# Patient Record
Sex: MALE | Race: BLACK OR AFRICAN AMERICAN | URBAN - METROPOLITAN AREA
[De-identification: names, ages, dates, MRNs, and addresses within clinical notes are randomized per-mention and may not be internally consistent; named-entity substitution may affect disease eponyms.]

---

## 2021-08-26 ENCOUNTER — OFFICE VISIT (OUTPATIENT)
Dept: URBAN - METROPOLITAN AREA CLINIC 105 | Facility: CLINIC | Age: 51
End: 2021-08-26
Payer: COMMERCIAL

## 2021-08-26 ENCOUNTER — WEB ENCOUNTER (OUTPATIENT)
Dept: URBAN - METROPOLITAN AREA CLINIC 105 | Facility: CLINIC | Age: 51
End: 2021-08-26

## 2021-08-26 DIAGNOSIS — K50.10 CROHN'S DISEASE OF LARGE INTESTINE WITHOUT COMPLICATION: ICD-10-CM

## 2021-08-26 PROCEDURE — 99204 OFFICE O/P NEW MOD 45 MIN: CPT | Performed by: INTERNAL MEDICINE

## 2021-08-26 RX ORDER — ADALIMUMAB 40MG/0.4ML
1 SUBQ  Q 2 WEEKS KIT SUBCUTANEOUS
Qty: 2 | Refills: 11 | OUTPATIENT
Start: 2021-08-28 | End: 2022-08-23

## 2021-08-26 RX ORDER — ATORVASTATIN CALCIUM 20 MG/1
1 TABLET TABLET, FILM COATED ORAL ONCE A DAY
Status: ACTIVE | COMMUNITY

## 2021-08-26 RX ORDER — LOSARTAN POTASSIUM AND HYDROCHLOROTHIAZIDE 12.5; 5 MG/1; MG/1
TABLET ORAL
Qty: 90 | Status: ACTIVE | COMMUNITY

## 2021-08-26 RX ORDER — GLUCOSAMINE/CHONDR SU A SOD 750-600 MG
1 CAPSULE TABLET ORAL ONCE A DAY
Status: ACTIVE | COMMUNITY

## 2021-08-26 RX ORDER — ADALIMUMAB 40MG/0.8ML
KIT SUBCUTANEOUS
Qty: 6 | Status: ACTIVE | COMMUNITY

## 2021-08-26 RX ORDER — OMEGA-3 FATTY ACIDS/FISH OIL 300-1000MG
1 CAPSULE CAPSULE ORAL ONCE A DAY
Status: ACTIVE | COMMUNITY

## 2021-08-26 NOTE — HPI-TODAY'S VISIT:
The patient presents for Crohn's disease.  Today, the patient says he moved to [place] from [place] 1 year ago. He previously followed-up with Dr. Shmuel Lizarraga at [facility]. He says he was initially diagnosed with UC then Crohn's 15 years ago - definite inflammation in colon. He has been on Humira every 2 weeks starting in 2012, but was off the injections for 3 weeks during which he had a colon (Sept 2020, his last one) which noted inflammation. He was off Humira from Sept 2020-April 2021 because of a lapse in insurance - coverage resumed in March. His PCP is Conchita Palacios MD ([facility]). He has previously taken Lialda and prior to that sulfasalazine. He has never taken 6-MP. He denies diarrhea, rectal bleeding, or abdominal pain. Last labs were more than a year ago. He is in the film business.  PMH: Htn, hyperlipidemia, vit D deficiency. PSH: Finger surgery - right hand. SH: social drinker 2x/week, no recreational or tobacco use. FH: Father - HD, cancer.

## 2021-08-28 LAB
A/G RATIO: 1.2
ALBUMIN: 4.1
ALKALINE PHOSPHATASE: 89
ALT (SGPT): 23
AST (SGOT): 28
BASO (ABSOLUTE): 0
BASOS: 1
BILIRUBIN, TOTAL: 0.4
BUN/CREATININE RATIO: 10
BUN: 11
CALCIUM: 9.3
CARBON DIOXIDE, TOTAL: 22
CHLORIDE: 103
CREATININE: 1.1
EGFR IF AFRICN AM: 89
EGFR IF NONAFRICN AM: 77
EOS (ABSOLUTE): 0.1
EOS: 2
GLOBULIN, TOTAL: 3.3
GLUCOSE: 80
HBSAG SCREEN: NEGATIVE
HEMATOCRIT: 43
HEMATOLOGY COMMENTS:: (no result)
HEMOGLOBIN: 13.1
HEP B CORE AB, TOT: NEGATIVE
HEPATITIS B SURF AB QUANT: <3.1
IMMATURE CELLS: (no result)
IMMATURE GRANS (ABS): 0
IMMATURE GRANULOCYTES: 0
LYMPHS (ABSOLUTE): 1.7
LYMPHS: 37
MCH: 25.3
MCHC: 30.5
MCV: 83
MONOCYTES(ABSOLUTE): 0.7
MONOCYTES: 15
NEUTROPHILS (ABSOLUTE): 2.2
NEUTROPHILS: 45
NRBC: (no result)
PLATELETS: 263
POTASSIUM: 4
PROTEIN, TOTAL: 7.4
QUANTIFERON CRITERIA: (no result)
QUANTIFERON INCUBATION: (no result)
QUANTIFERON MITOGEN VALUE: >10
QUANTIFERON NIL VALUE: 0.05
QUANTIFERON TB1 AG VALUE: 0.06
QUANTIFERON TB2 AG VALUE: 0.09
QUANTIFERON-TB GOLD PLUS: NEGATIVE
RBC: 5.17
RDW: 13.9
SODIUM: 138
T4,FREE(DIRECT): 1.26
TSH: 1.08
WBC: 4.7

## 2021-12-14 ENCOUNTER — OFFICE VISIT (OUTPATIENT)
Dept: URBAN - METROPOLITAN AREA CLINIC 105 | Facility: CLINIC | Age: 51
End: 2021-12-14

## 2022-03-30 ENCOUNTER — WEB ENCOUNTER (OUTPATIENT)
Dept: URBAN - METROPOLITAN AREA CLINIC 105 | Facility: CLINIC | Age: 52
End: 2022-03-30

## 2022-03-30 ENCOUNTER — LAB OUTSIDE AN ENCOUNTER (OUTPATIENT)
Dept: URBAN - METROPOLITAN AREA CLINIC 105 | Facility: CLINIC | Age: 52
End: 2022-03-30

## 2022-03-30 ENCOUNTER — OFFICE VISIT (OUTPATIENT)
Dept: URBAN - METROPOLITAN AREA CLINIC 105 | Facility: CLINIC | Age: 52
End: 2022-03-30
Payer: COMMERCIAL

## 2022-03-30 VITALS
BODY MASS INDEX: 26.64 KG/M2 | HEART RATE: 76 BPM | HEIGHT: 73 IN | TEMPERATURE: 96.8 F | DIASTOLIC BLOOD PRESSURE: 98 MMHG | WEIGHT: 201 LBS | SYSTOLIC BLOOD PRESSURE: 148 MMHG

## 2022-03-30 DIAGNOSIS — K50.10 CROHN'S DISEASE OF LARGE INTESTINE WITHOUT COMPLICATION: ICD-10-CM

## 2022-03-30 PROCEDURE — 99214 OFFICE O/P EST MOD 30 MIN: CPT | Performed by: INTERNAL MEDICINE

## 2022-03-30 RX ORDER — GLUCOSAMINE/CHONDR SU A SOD 750-600 MG
1 CAPSULE TABLET ORAL ONCE A DAY
Status: ACTIVE | COMMUNITY

## 2022-03-30 RX ORDER — ATORVASTATIN CALCIUM 20 MG/1
1 TABLET TABLET, FILM COATED ORAL ONCE A DAY
Status: ACTIVE | COMMUNITY

## 2022-03-30 RX ORDER — OMEGA-3 FATTY ACIDS/FISH OIL 300-1000MG
1 CAPSULE CAPSULE ORAL ONCE A DAY
Status: ACTIVE | COMMUNITY

## 2022-03-30 RX ORDER — ADALIMUMAB 40MG/0.4ML
1 SUBQ  Q 2 WEEKS KIT SUBCUTANEOUS
Qty: 2 | Refills: 11 | Status: ACTIVE | COMMUNITY
Start: 2021-08-28 | End: 2022-08-23

## 2022-03-30 RX ORDER — ADALIMUMAB 40MG/0.8ML
KIT SUBCUTANEOUS
Qty: 6 | Status: ACTIVE | COMMUNITY

## 2022-03-30 RX ORDER — LOSARTAN POTASSIUM AND HYDROCHLOROTHIAZIDE 12.5; 5 MG/1; MG/1
TABLET ORAL
Qty: 90 | Status: ACTIVE | COMMUNITY

## 2022-03-30 NOTE — HPI-TODAY'S VISIT:
The patient presents on follow-up for Crohn's disease.  PAST MEDICAL HISTORY: On 8/26/21, the patient said he moved to [place] from [place] 1 year ago. He previously followed-up with Dr. Shmuel Lizarraga at [facility]. He said he was initially diagnosed with UC then Crohn's 15 years ago - definite inflammation in colon. He had been on Humira every 2 weeks starting in 2012, but was off the injections for 3 weeks during which he had a colon (Sept 2020, his last one) which noted inflammation. He was off Humira from Sept 2020-April 2021 because of a lapse in insurance - coverage resumed in March. His PCP is Conchita Palacios MD ([facility]). He had previously taken Lialda and prior to that sulfasalazine. He had never taken 6-MP. He denied diarrhea, rectal bleeding, or abdominal pain. Last labs were more than a year ago. He was in the film business.  PMH: Htn, hyperlipidemia, vit D deficiency. PSH: Finger surgery - right hand. SH: social drinker 2x/week, no recreational or tobacco use. FH: Father - HD, cancer.  HPI: Today, he says he continues on Humira Q 2 weeks - remembers to take his dose (typically on Sundays). He has incorporated more fish and veggies into his diet. He reports looser stools lately. He has a BM 3-4x/week occasionally with no formation (soft). He does not see blood.   Labs 8/26/21 - HbsAg <3.1. Quant-TB negative. CBC, CMP, TSH/FT4 all normal.

## 2022-04-12 LAB
A/G RATIO: 1.5
ADALIMUMAB DRUG LEVEL: 3.6
ALBUMIN: 4.5
ALKALINE PHOSPHATASE: 80
ALT (SGPT): 17
ANTI-ADALIMUMAB ANTIBODY: <25
AST (SGOT): 19
BASO (ABSOLUTE): 0
BASOS: 1
BILIRUBIN, TOTAL: 0.3
BUN/CREATININE RATIO: 12
BUN: 13
CALCIUM: 9.5
CARBON DIOXIDE, TOTAL: 21
CHLORIDE: 104
CREATININE: 1.11
EGFR: 80
EOS (ABSOLUTE): 0.1
EOS: 1
GLOBULIN, TOTAL: 3.1
GLUCOSE: 91
HEMATOCRIT: 43.1
HEMATOLOGY COMMENTS:: (no result)
HEMOGLOBIN: 13.5
IMMATURE CELLS: (no result)
IMMATURE GRANS (ABS): 0
IMMATURE GRANULOCYTES: 0
LYMPHS (ABSOLUTE): 2.6
LYMPHS: 57
MCH: 25.1
MCHC: 31.3
MCV: 80
MONOCYTES(ABSOLUTE): 0.7
MONOCYTES: 14
NEUTROPHILS (ABSOLUTE): 1.2
NEUTROPHILS: 27
NRBC: (no result)
PLATELETS: 236
POTASSIUM: 3.7
PROTEIN, TOTAL: 7.6
QUANTIFERON CRITERIA: (no result)
QUANTIFERON INCUBATION: (no result)
QUANTIFERON MITOGEN VALUE: >10
QUANTIFERON NIL VALUE: 0.22
QUANTIFERON TB1 AG VALUE: 0.18
QUANTIFERON TB2 AG VALUE: 0.28
QUANTIFERON-TB GOLD PLUS: NEGATIVE
RBC: 5.37
RDW: 14.8
SODIUM: 139
WBC: 4.5

## 2022-08-29 ENCOUNTER — ERX REFILL RESPONSE (OUTPATIENT)
Dept: URBAN - METROPOLITAN AREA CLINIC 105 | Facility: CLINIC | Age: 52
End: 2022-08-29

## 2022-08-29 RX ORDER — ADALIMUMAB 40MG/0.8ML
KIT SUBCUTANEOUS
Qty: 6 | OUTPATIENT

## 2022-08-29 RX ORDER — ADALIMUMAB 40MG/0.8ML
40 MG KIT SUBCUTANEOUS EVERY OTHER WEEK
Qty: 6 | Refills: 3 | OUTPATIENT

## 2022-09-01 ENCOUNTER — OFFICE VISIT (OUTPATIENT)
Dept: URBAN - METROPOLITAN AREA CLINIC 105 | Facility: CLINIC | Age: 52
End: 2022-09-01

## 2022-09-12 ENCOUNTER — OFFICE VISIT (OUTPATIENT)
Dept: URBAN - METROPOLITAN AREA SURGERY CENTER 16 | Facility: SURGERY CENTER | Age: 52
End: 2022-09-12

## 2022-09-20 ENCOUNTER — TELEPHONE ENCOUNTER (OUTPATIENT)
Dept: URBAN - METROPOLITAN AREA CLINIC 105 | Facility: CLINIC | Age: 52
End: 2022-09-20

## 2022-10-04 ENCOUNTER — LAB OUTSIDE AN ENCOUNTER (OUTPATIENT)
Dept: URBAN - METROPOLITAN AREA CLINIC 105 | Facility: CLINIC | Age: 52
End: 2022-10-04

## 2022-10-04 ENCOUNTER — OFFICE VISIT (OUTPATIENT)
Dept: URBAN - METROPOLITAN AREA CLINIC 105 | Facility: CLINIC | Age: 52
End: 2022-10-04
Payer: COMMERCIAL

## 2022-10-04 VITALS
TEMPERATURE: 97.3 F | SYSTOLIC BLOOD PRESSURE: 151 MMHG | DIASTOLIC BLOOD PRESSURE: 96 MMHG | HEART RATE: 71 BPM | BODY MASS INDEX: 25.34 KG/M2 | HEIGHT: 73 IN | WEIGHT: 191.2 LBS

## 2022-10-04 DIAGNOSIS — K50.10 CROHN'S DISEASE OF LARGE INTESTINE WITHOUT COMPLICATION: ICD-10-CM

## 2022-10-04 DIAGNOSIS — E55.9 VITAMIN D DEFICIENCY: ICD-10-CM

## 2022-10-04 PROCEDURE — 99214 OFFICE O/P EST MOD 30 MIN: CPT | Performed by: INTERNAL MEDICINE

## 2022-10-04 RX ORDER — LOSARTAN POTASSIUM AND HYDROCHLOROTHIAZIDE 12.5; 5 MG/1; MG/1
TABLET ORAL
Qty: 90 | Status: ACTIVE | COMMUNITY

## 2022-10-04 RX ORDER — OMEGA-3 FATTY ACIDS/FISH OIL 300-1000MG
1 CAPSULE CAPSULE ORAL ONCE A DAY
Status: ACTIVE | COMMUNITY

## 2022-10-04 RX ORDER — GLUCOSAMINE/CHONDR SU A SOD 750-600 MG
1 CAPSULE TABLET ORAL ONCE A DAY
Status: ACTIVE | COMMUNITY

## 2022-10-04 RX ORDER — ATORVASTATIN CALCIUM 20 MG/1
1 TABLET TABLET, FILM COATED ORAL ONCE A DAY
Status: ACTIVE | COMMUNITY

## 2022-10-04 RX ORDER — ADALIMUMAB 40MG/0.8ML
40 MG KIT SUBCUTANEOUS EVERY OTHER WEEK
Qty: 6 | Refills: 3 | Status: ACTIVE | COMMUNITY

## 2022-10-04 NOTE — HPI-TODAY'S VISIT:
The patient presents on follow-up for Crohn's disease.  PAST MEDICAL HISTORY: On 8/26/21, the patient said he moved to [place] from [place] 1 year ago. He previously followed-up with Dr. Shmuel Lizarraga at [facility]. He said he was initially diagnosed with UC then Crohn's 15 years ago - definite inflammation in colon. He had been on Humira every 2 weeks starting in 2012, but was off the injections for 3 weeks during which he had a colon (Sept 2020, his last one) which noted inflammation. He was off Humira from Sept 2020-April 2021 because of a lapse in insurance - coverage resumed in March. His PCP is Conchita Palacios MD ([facility]). He had previously taken Lialda and prior to that sulfasalazine. He had never taken 6-MP. He denied diarrhea, rectal bleeding, or abdominal pain. Last labs were more than a year ago. He was in the film business.  PMH: Htn, hyperlipidemia, vit D deficiency. PSH: Finger surgery - right hand. SH: social drinker 2x/week, no recreational or tobacco use. FH: Father - HD, cancer.  On 3/30/22, he said he continued on Humira Q 2 weeks - remembered to take his dose (typically on Sundays). He had incorporated more fish and veggies into his diet. He reported looser stools lately. He had a BM 3-4x/week occasionally with no formation (soft). He did not see blood.  HPI: Today, he says he is compliant with taking Humira every other week - no bleeding, abdominal pain, or diarrhea. He finished Cephalexin 2 weeks ago for a kidney infection.    Labs 4/1/22 - Adalimumab drug level 3.6/Ab <25. Quant-TB negative. CBC, CMP all normal. 8/26/21 - HbsAg <3.1. Quant-TB negative. CBC, CMP, TSH/FT4 all normal.

## 2022-10-05 LAB
A/G RATIO: 1.2
ABSOLUTE BASOPHILS: 22
ABSOLUTE EOSINOPHILS: 72
ABSOLUTE LYMPHOCYTES: 1955
ABSOLUTE MONOCYTES: 443
ABSOLUTE NEUTROPHILS: 1109
ALBUMIN: 4.3
ALKALINE PHOSPHATASE: 86
ALT (SGPT): 17
AST (SGOT): 18
BASOPHILS: 0.6
BILIRUBIN, TOTAL: 0.6
BUN/CREATININE RATIO: (no result)
BUN: 16
CALCIUM: 9.7
CARBON DIOXIDE, TOTAL: 24
CHLORIDE: 104
CREATININE: 0.95
EGFR: 96
EOSINOPHILS: 2
GLOBULIN, TOTAL: 3.5
GLUCOSE: 81
HEMATOCRIT: 40.2
HEMOGLOBIN: 13.4
LYMPHOCYTES: 54.3
MCH: 27.1
MCHC: 33.3
MCV: 81.4
MONOCYTES: 12.3
MPV: 11
NEUTROPHILS: 30.8
PLATELET COUNT: 175
POTASSIUM: 3.7
PROTEIN, TOTAL: 7.8
RDW: 14.7
RED BLOOD CELL COUNT: 4.94
SODIUM: 139
VITAMIN D,25-OH,TOTAL,IA: 77
WHITE BLOOD CELL COUNT: 3.6

## 2022-10-17 ENCOUNTER — OFFICE VISIT (OUTPATIENT)
Dept: URBAN - METROPOLITAN AREA SURGERY CENTER 16 | Facility: SURGERY CENTER | Age: 52
End: 2022-10-17

## 2023-05-08 ENCOUNTER — ERX REFILL RESPONSE (OUTPATIENT)
Dept: URBAN - METROPOLITAN AREA CLINIC 105 | Facility: CLINIC | Age: 53
End: 2023-05-08

## 2023-05-08 RX ORDER — ADALIMUMAB 40MG/0.4ML
INJECT 1 PEN UNDER THE SKIN EVERY 14 DAYS KIT SUBCUTANEOUS
Qty: 6 | Refills: 3 | OUTPATIENT

## 2023-05-08 RX ORDER — ADALIMUMAB 40MG/0.8ML
40 MG KIT SUBCUTANEOUS EVERY OTHER WEEK
Qty: 6 | Refills: 3 | OUTPATIENT

## 2023-08-23 ENCOUNTER — OFFICE VISIT (OUTPATIENT)
Dept: URBAN - METROPOLITAN AREA CLINIC 105 | Facility: CLINIC | Age: 53
End: 2023-08-23
Payer: COMMERCIAL

## 2023-08-23 ENCOUNTER — LAB OUTSIDE AN ENCOUNTER (OUTPATIENT)
Dept: URBAN - METROPOLITAN AREA CLINIC 105 | Facility: CLINIC | Age: 53
End: 2023-08-23

## 2023-08-23 VITALS
DIASTOLIC BLOOD PRESSURE: 83 MMHG | BODY MASS INDEX: 25.98 KG/M2 | WEIGHT: 196 LBS | HEART RATE: 93 BPM | SYSTOLIC BLOOD PRESSURE: 130 MMHG | TEMPERATURE: 98 F | HEIGHT: 73 IN

## 2023-08-23 DIAGNOSIS — E55.9 VITAMIN D DEFICIENCY: ICD-10-CM

## 2023-08-23 DIAGNOSIS — K50.10 CROHN'S DISEASE OF LARGE INTESTINE WITHOUT COMPLICATION: ICD-10-CM

## 2023-08-23 PROCEDURE — 99214 OFFICE O/P EST MOD 30 MIN: CPT | Performed by: INTERNAL MEDICINE

## 2023-08-23 RX ORDER — ATORVASTATIN CALCIUM 20 MG/1
1 TABLET TABLET, FILM COATED ORAL ONCE A DAY
Status: ACTIVE | COMMUNITY

## 2023-08-23 RX ORDER — GLUCOSAMINE/CHONDR SU A SOD 750-600 MG
1 CAPSULE TABLET ORAL ONCE A DAY
Status: ACTIVE | COMMUNITY

## 2023-08-23 RX ORDER — LOSARTAN POTASSIUM AND HYDROCHLOROTHIAZIDE 12.5; 5 MG/1; MG/1
TABLET ORAL
Qty: 90 | Status: ACTIVE | COMMUNITY

## 2023-08-23 RX ORDER — OMEGA-3 FATTY ACIDS/FISH OIL 300-1000MG
1 CAPSULE CAPSULE ORAL ONCE A DAY
Status: ACTIVE | COMMUNITY

## 2023-08-23 RX ORDER — ADALIMUMAB 40MG/0.4ML
INJECT 1 PEN UNDER THE SKIN EVERY 14 DAYS KIT SUBCUTANEOUS
Qty: 6 | Refills: 3 | Status: ACTIVE | COMMUNITY

## 2023-08-23 NOTE — HPI-TODAY'S VISIT:
The patient presents on follow-up for Crohn's disease.  PAST MEDICAL HISTORY: On 8/26/21, the patient said he moved to [place] from [place] 1 year ago. He previously followed-up with Dr. Shmuel Lizarraga at [facility]. He said he was initially diagnosed with UC then Crohn's 15 years ago - definite inflammation in colon. He had been on Humira every 2 weeks starting in 2012, but was off the injections for 3 weeks during which he had a colon (Sept 2020, his last one) which noted inflammation. He was off Humira from Sept 2020-April 2021 because of a lapse in insurance - coverage resumed in March. His PCP is Conchita Palacios MD ([facility]). He had previously taken Lialda and prior to that sulfasalazine. He had never taken 6-MP. He denied diarrhea, rectal bleeding, or abdominal pain. Last labs were more than a year ago. He was in the film business.  PMH: Htn, hyperlipidemia, vit D deficiency. PSH: Finger surgery - right hand. SH: social drinker 2x/week, no recreational or tobacco use. FH: Father - HD, cancer.  On 3/30/22, he said he continued on Humira Q 2 weeks - remembered to take his dose (typically on Sundays). He had incorporated more fish and veggies into his diet. He reported looser stools lately. He had a BM 3-4x/week occasionally with no formation (soft). He did not see blood.  On 10/4/22, he said he was compliant with taking Humira every other week - no bleeding, abdominal pain, or diarrhea. He finished Cephalexin 2 weeks ago for a kidney infection.    HPI: Today, he says he had a recent kidney infection. He continues on Humira Q2W - no diarrhea, pain, or bleeding. He is on vit D supplementation. He is currently not working due to the [employer] strike.  Labs 10/4/22 - CBC, CMP, vit D all normal. 4/1/22 - Adalimumab drug level 3.6/Ab <25. Quant-TB negative. CBC, CMP all normal. 8/26/21 - HbsAg <3.1. Quant-TB negative. CBC, CMP, TSH/FT4 all normal.

## 2023-08-27 PROBLEM — 34713006: Status: ACTIVE | Noted: 2022-10-04

## 2023-09-05 LAB
A/G RATIO: 1.4
ALBUMIN: 4.6
ALKALINE PHOSPHATASE: 80
ALT (SGPT): 12
AST (SGOT): 19
BASO (ABSOLUTE): 0
BASOS: 1
BILIRUBIN, TOTAL: 0.6
BUN/CREATININE RATIO: 10
BUN: 11
CALCIUM: 9.4
CARBON DIOXIDE, TOTAL: 20
CHLORIDE: 107
CREATININE: 1.05
EGFR: 84
EOS (ABSOLUTE): 0
EOS: 1
GLOBULIN, TOTAL: 3.2
GLUCOSE: 93
HEMATOCRIT: 44.1
HEMATOLOGY COMMENTS:: (no result)
HEMOGLOBIN: 14
IMMATURE CELLS: (no result)
IMMATURE GRANS (ABS): 0
IMMATURE GRANULOCYTES: 0
INTERPRETATION:: (no result)
LYMPHS (ABSOLUTE): 1.6
LYMPHS: 40
Lab: (no result)
MCH: 26.7
MCHC: 31.7
MCV: 84
MONOCYTES(ABSOLUTE): 0.6
MONOCYTES: 14
NEUTROPHILS (ABSOLUTE): 1.8
NEUTROPHILS: 44
NRBC: (no result)
PLATELETS: 231
POTASSIUM: 3.7
PROTEIN, TOTAL: 7.8
QUANTIFERON CRITERIA: (no result)
QUANTIFERON INCUBATION: (no result)
QUANTIFERON MITOGEN VALUE: >10
QUANTIFERON NIL VALUE: 0.05
QUANTIFERON TB1 AG VALUE: 0.06
QUANTIFERON TB2 AG VALUE: 0.07
QUANTIFERON-TB GOLD PLUS: NEGATIVE
RBC: 5.25
RDW: 14.5
SODIUM: 141
TPMT ACTIVITY: 27
WBC: 4.1

## 2023-09-14 ENCOUNTER — CLAIMS CREATED FROM THE CLAIM WINDOW (OUTPATIENT)
Dept: URBAN - METROPOLITAN AREA CLINIC 4 | Facility: CLINIC | Age: 53
End: 2023-09-14
Payer: COMMERCIAL

## 2023-09-14 ENCOUNTER — OUT OF OFFICE VISIT (OUTPATIENT)
Dept: URBAN - METROPOLITAN AREA SURGERY CENTER 16 | Facility: SURGERY CENTER | Age: 53
End: 2023-09-14
Payer: COMMERCIAL

## 2023-09-14 DIAGNOSIS — K63.89 APPENDICITIS EPIPLOICA: ICD-10-CM

## 2023-09-14 DIAGNOSIS — K62.89 ACUTE PROCTITIS: ICD-10-CM

## 2023-09-14 DIAGNOSIS — K50.10 CROHN'S DISEASE OF LARGE INTESTINE WITHOUT COMPLICATIONS: ICD-10-CM

## 2023-09-14 DIAGNOSIS — K52.89 (LYMPHOCYTIC) MICROSCOPIC COLITIS: ICD-10-CM

## 2023-09-14 DIAGNOSIS — K52.89 OTHER SPECIFIED NONINFECTIVE GASTROENTERITIS AND COLITIS: ICD-10-CM

## 2023-09-14 DIAGNOSIS — K50.10 CC (CROHN'S COLITIS): ICD-10-CM

## 2023-09-14 DIAGNOSIS — K64.8 EXTERNAL HEMORRHOIDS: ICD-10-CM

## 2023-09-14 PROCEDURE — 00811 ANES LWR INTST NDSC NOS: CPT | Performed by: ANESTHESIOLOGY

## 2023-09-14 PROCEDURE — 88305 TISSUE EXAM BY PATHOLOGIST: CPT | Performed by: PATHOLOGY

## 2023-09-14 PROCEDURE — 45380 COLONOSCOPY AND BIOPSY: CPT | Performed by: INTERNAL MEDICINE

## 2023-09-14 PROCEDURE — G8907 PT DOC NO EVENTS ON DISCHARG: HCPCS | Performed by: INTERNAL MEDICINE

## 2023-09-14 PROCEDURE — 00811 ANES LWR INTST NDSC NOS: CPT | Performed by: NURSE ANESTHETIST, CERTIFIED REGISTERED

## 2023-09-14 RX ORDER — GLUCOSAMINE/CHONDR SU A SOD 750-600 MG
1 CAPSULE TABLET ORAL ONCE A DAY
Status: ACTIVE | COMMUNITY

## 2023-09-14 RX ORDER — LOSARTAN POTASSIUM AND HYDROCHLOROTHIAZIDE 12.5; 5 MG/1; MG/1
TABLET ORAL
Qty: 90 | Status: ACTIVE | COMMUNITY

## 2023-09-14 RX ORDER — ATORVASTATIN CALCIUM 20 MG/1
1 TABLET TABLET, FILM COATED ORAL ONCE A DAY
Status: ACTIVE | COMMUNITY

## 2023-09-14 RX ORDER — OMEGA-3 FATTY ACIDS/FISH OIL 300-1000MG
1 CAPSULE CAPSULE ORAL ONCE A DAY
Status: ACTIVE | COMMUNITY

## 2023-09-14 RX ORDER — ADALIMUMAB 40MG/0.4ML
INJECT 1 PEN UNDER THE SKIN EVERY 14 DAYS KIT SUBCUTANEOUS
Qty: 6 | Refills: 3 | Status: ACTIVE | COMMUNITY

## 2023-10-02 PROBLEM — 7620006: Status: ACTIVE | Noted: 2023-10-02

## 2023-10-04 ENCOUNTER — OFFICE VISIT (OUTPATIENT)
Dept: URBAN - METROPOLITAN AREA CLINIC 105 | Facility: CLINIC | Age: 53
End: 2023-10-04
Payer: COMMERCIAL

## 2023-10-04 VITALS
HEART RATE: 73 BPM | DIASTOLIC BLOOD PRESSURE: 88 MMHG | TEMPERATURE: 98.3 F | SYSTOLIC BLOOD PRESSURE: 142 MMHG | HEIGHT: 73 IN | BODY MASS INDEX: 26.19 KG/M2 | WEIGHT: 197.6 LBS

## 2023-10-04 DIAGNOSIS — E55.9 VITAMIN D DEFICIENCY: ICD-10-CM

## 2023-10-04 DIAGNOSIS — K50.10 CROHN'S DISEASE OF LARGE INTESTINE WITHOUT COMPLICATION: ICD-10-CM

## 2023-10-04 PROCEDURE — 99214 OFFICE O/P EST MOD 30 MIN: CPT | Performed by: INTERNAL MEDICINE

## 2023-10-04 RX ORDER — LOSARTAN POTASSIUM AND HYDROCHLOROTHIAZIDE 12.5; 5 MG/1; MG/1
TABLET ORAL
Qty: 90 | Status: ACTIVE | COMMUNITY

## 2023-10-04 RX ORDER — OMEGA-3 FATTY ACIDS/FISH OIL 300-1000MG
1 CAPSULE CAPSULE ORAL ONCE A DAY
Status: ACTIVE | COMMUNITY

## 2023-10-04 RX ORDER — GLUCOSAMINE/CHONDR SU A SOD 750-600 MG
1 CAPSULE TABLET ORAL ONCE A DAY
Status: ACTIVE | COMMUNITY

## 2023-10-04 RX ORDER — ADALIMUMAB 40MG/0.4ML
INJECT 1 PEN UNDER THE SKIN EVERY 14 DAYS KIT SUBCUTANEOUS
Qty: 6 | Refills: 3 | Status: ACTIVE | COMMUNITY

## 2023-10-04 RX ORDER — ATORVASTATIN CALCIUM 20 MG/1
1 TABLET TABLET, FILM COATED ORAL ONCE A DAY
Status: ACTIVE | COMMUNITY

## 2023-10-04 NOTE — HPI-TODAY'S VISIT:
The patient presents on follow-up for Crohn's disease.  PAST MEDICAL HISTORY: On 8/26/21, the patient said he moved to [place] from [place] 1 year ago. He previously followed-up with Dr. Shmuel Lizarraga at [facility]. He said he was initially diagnosed with UC then Crohn's 15 years ago - definite inflammation in colon. He had been on Humira every 2 weeks starting in 2012, but was off the injections for 3 weeks during which he had a colon (Sept 2020, his last one) which noted inflammation. He was off Humira from Sept 2020-April 2021 because of a lapse in insurance - coverage resumed in March. His PCP is Conchita Palacios MD ([facility]). He had previously taken Lialda and prior to that sulfasalazine. He had never taken 6-MP. He denied diarrhea, rectal bleeding, or abdominal pain. Last labs were more than a year ago. He was in the film business.  PMH: Htn, hyperlipidemia, vit D deficiency. PSH: Finger surgery - right hand. SH: social drinker 2x/week, no recreational or tobacco use. FH: Father - HD, cancer.  On 3/30/22, he said he continued on Humira Q 2 weeks - remembered to take his dose (typically on Sundays). He had incorporated more fish and veggies into his diet. He reported looser stools lately. He had a BM 3-4x/week occasionally with no formation (soft). He did not see blood.  On 10/4/22, he said he was compliant with taking Humira every other week - no bleeding, abdominal pain, or diarrhea. He finished Cephalexin 2 weeks ago for a kidney infection.    On 8/23/23, he said he had a recent kidney infection. He continued on Humira Q2W - no diarrhea, pain, or bleeding. He was on vit D supplementation. He was currently not working due to the [employer] strike.  HPI: Today, he says he continues on Humira Q2W. His last injection was on Sept 24 - no diarrhea, abdominal pain, or bleeding.  Labs 8/23/23 - Quant-TB negative. CBC, CMP, TPMT all normal. 10/4/22 - CBC, CMP, vit D all normal. 4/1/22 - Adalimumab drug level 3.6/Ab <25. Quant-TB negative. CBC, CMP all normal. 8/26/21 - HbsAg <3.1. Quant-TB negative. CBC, CMP, TSH/FT4 all normal.

## 2023-10-16 ENCOUNTER — TELEPHONE ENCOUNTER (OUTPATIENT)
Dept: URBAN - METROPOLITAN AREA CLINIC 105 | Facility: CLINIC | Age: 53
End: 2023-10-16

## 2023-10-16 LAB
ADALIMUMAB DRUG LEVEL: 6.1
ANTI-ADALIMUMAB ANTIBODY: <25
Lab: (no result)

## 2023-10-16 RX ORDER — ADALIMUMAB 40MG/0.4ML
40 MG KIT SUBCUTANEOUS WEEKLY
Qty: 4 | Refills: 11 | OUTPATIENT

## 2023-12-05 ENCOUNTER — OFFICE VISIT (OUTPATIENT)
Dept: URBAN - METROPOLITAN AREA CLINIC 105 | Facility: CLINIC | Age: 53
End: 2023-12-05
Payer: COMMERCIAL

## 2023-12-05 VITALS
TEMPERATURE: 97.9 F | SYSTOLIC BLOOD PRESSURE: 152 MMHG | HEART RATE: 73 BPM | DIASTOLIC BLOOD PRESSURE: 94 MMHG | WEIGHT: 201 LBS | HEIGHT: 74 IN | BODY MASS INDEX: 25.8 KG/M2

## 2023-12-05 DIAGNOSIS — K50.10 CROHN'S DISEASE OF LARGE INTESTINE WITHOUT COMPLICATION: ICD-10-CM

## 2023-12-05 DIAGNOSIS — E55.9 VITAMIN D DEFICIENCY: ICD-10-CM

## 2023-12-05 PROCEDURE — 99214 OFFICE O/P EST MOD 30 MIN: CPT | Performed by: INTERNAL MEDICINE

## 2023-12-05 RX ORDER — GLUCOSAMINE/CHONDR SU A SOD 750-600 MG
1 CAPSULE TABLET ORAL ONCE A DAY
Status: ACTIVE | COMMUNITY

## 2023-12-05 RX ORDER — ATORVASTATIN CALCIUM 20 MG/1
1 TABLET TABLET, FILM COATED ORAL ONCE A DAY
Status: ACTIVE | COMMUNITY

## 2023-12-05 RX ORDER — ADALIMUMAB 40MG/0.4ML
40 MG KIT SUBCUTANEOUS WEEKLY
Qty: 2 | Refills: 11 | OUTPATIENT

## 2023-12-05 RX ORDER — ADALIMUMAB 40MG/0.4ML
40 MG KIT SUBCUTANEOUS WEEKLY
Qty: 4 | Refills: 11 | Status: ACTIVE | COMMUNITY

## 2023-12-05 RX ORDER — LOSARTAN POTASSIUM AND HYDROCHLOROTHIAZIDE 12.5; 5 MG/1; MG/1
TABLET ORAL
Qty: 90 | Status: ACTIVE | COMMUNITY

## 2023-12-05 RX ORDER — OMEGA-3 FATTY ACIDS/FISH OIL 300-1000MG
1 CAPSULE CAPSULE ORAL ONCE A DAY
Status: ACTIVE | COMMUNITY

## 2023-12-05 NOTE — HPI-TODAY'S VISIT:
The patient presents on follow-up for Crohn's disease.  PAST MEDICAL HISTORY: On 8/26/21, the patient said he moved to [place] from [place] 1 year ago. He previously followed-up with Dr. Shmuel Lizarraga at [facility]. He said he was initially diagnosed with UC then Crohn's 15 years ago - definite inflammation in colon. He had been on Humira every 2 weeks starting in 2012, but was off the injections for 3 weeks during which he had a colon (Sept 2020, his last one) which noted inflammation. He was off Humira from Sept 2020-April 2021 because of a lapse in insurance - coverage resumed in March. His PCP is Conchita Palacios MD ([facility]). He had previously taken Lialda and prior to that sulfasalazine. He had never taken 6-MP. He denied diarrhea, rectal bleeding, or abdominal pain. Last labs were more than a year ago. He was in the film business.  PMH: Htn, hyperlipidemia, vit D deficiency. PSH: Finger surgery - right hand. SH: social drinker 2x/week, no recreational or tobacco use. FH: Father - HD, cancer.  On 3/30/22, he said he continued on Humira Q 2 weeks - remembered to take his dose (typically on Sundays). He had incorporated more fish and veggies into his diet. He reported looser stools lately. He had a BM 3-4x/week occasionally with no formation (soft). He did not see blood.  On 10/4/22, he said he was compliant with taking Humira every other week - no bleeding, abdominal pain, or diarrhea. He finished Cephalexin 2 weeks ago for a kidney infection.    On 8/23/23, he said he had a recent kidney infection. He continued on Humira Q2W - no diarrhea, pain, or bleeding. He was on vit D supplementation. He was currently not working due to the [employer] strike.  On 10/4/23, he said he continued on Humira Q2W. His last injection was on Sept 24 - no diarrhea, abdominal pain, or bleeding.  HPI: Today, he says he has not started on the weekly Humira dosing yet. No rectal bleeding or abdominal pain. Occaisonal diarrhea. He will have insurance until March.  Labs 10/6/23 - Adalimumab drug level 6.1/Ab < 25.  8/23/23 - Quant-TB negative. CBC, CMP, TPMT all normal. 10/4/22 - CBC, CMP, vit D all normal. 4/1/22 - Adalimumab drug level 3.6/Ab <25. Quant-TB negative. CBC, CMP all normal. 8/26/21 - HbsAg <3.1. Quant-TB negative. CBC, CMP, TSH/FT4 all normal.

## 2023-12-06 PROBLEM — 7620006: Status: ACTIVE | Noted: 2021-08-26

## 2024-01-19 ENCOUNTER — TELEPHONE ENCOUNTER (OUTPATIENT)
Dept: URBAN - METROPOLITAN AREA CLINIC 105 | Facility: CLINIC | Age: 54
End: 2024-01-19

## 2024-03-01 ENCOUNTER — OV EP (OUTPATIENT)
Dept: URBAN - METROPOLITAN AREA CLINIC 105 | Facility: CLINIC | Age: 54
End: 2024-03-01
Payer: COMMERCIAL

## 2024-03-01 VITALS
DIASTOLIC BLOOD PRESSURE: 102 MMHG | TEMPERATURE: 97.1 F | HEART RATE: 66 BPM | SYSTOLIC BLOOD PRESSURE: 172 MMHG | HEIGHT: 74 IN | BODY MASS INDEX: 25.69 KG/M2 | WEIGHT: 200.2 LBS

## 2024-03-01 DIAGNOSIS — K50.10 CROHN'S DISEASE OF LARGE INTESTINE WITHOUT COMPLICATION: ICD-10-CM

## 2024-03-01 DIAGNOSIS — E55.9 VITAMIN D DEFICIENCY: ICD-10-CM

## 2024-03-01 PROCEDURE — 99214 OFFICE O/P EST MOD 30 MIN: CPT | Performed by: INTERNAL MEDICINE

## 2024-03-01 RX ORDER — OMEGA-3 FATTY ACIDS/FISH OIL 300-1000MG
1 CAPSULE CAPSULE ORAL ONCE A DAY
Status: ACTIVE | COMMUNITY

## 2024-03-01 RX ORDER — MERCAPTOPURINE 50 MG/1
2 TABLETS TABLET ORAL DAILY
Qty: 60 TABLET | Refills: 2 | OUTPATIENT
Start: 2024-03-01

## 2024-03-01 RX ORDER — ADALIMUMAB 40MG/0.4ML
40 MG KIT SUBCUTANEOUS WEEKLY
Qty: 2 | Refills: 11 | Status: ACTIVE | COMMUNITY

## 2024-03-01 RX ORDER — GLUCOSAMINE/CHONDR SU A SOD 750-600 MG
1 CAPSULE TABLET ORAL ONCE A DAY
Status: ACTIVE | COMMUNITY

## 2024-03-01 RX ORDER — LOSARTAN POTASSIUM AND HYDROCHLOROTHIAZIDE 12.5; 5 MG/1; MG/1
TABLET ORAL
Qty: 90 | Status: ACTIVE | COMMUNITY

## 2024-03-01 RX ORDER — ATORVASTATIN CALCIUM 20 MG/1
1 TABLET TABLET, FILM COATED ORAL ONCE A DAY
Status: ACTIVE | COMMUNITY

## 2024-03-01 NOTE — HPI-TODAY'S VISIT:
The patient presents on follow-up for Crohn's disease.  PAST MEDICAL HISTORY: On 8/26/21, the patient said he moved to [place] from [place] 1 year ago. He previously followed-up with Dr. Shmuel Lizarraga at [facility]. He said he was initially diagnosed with UC then Crohn's 15 years ago - definite inflammation in colon. He had been on Humira every 2 weeks starting in 2012, but was off the injections for 3 weeks during which he had a colon (Sept 2020, his last one) which noted inflammation. He was off Humira from Sept 2020-April 2021 because of a lapse in insurance - coverage resumed in March. His PCP is Conchita Palacios MD ([facility]). He had previously taken Lialda and prior to that sulfasalazine. He had never taken 6-MP. He denied diarrhea, rectal bleeding, or abdominal pain. Last labs were more than a year ago. He was in the film business.  PMH: Htn, hyperlipidemia, vit D deficiency. PSH: Finger surgery - right hand. SH: social drinker 2x/week, no recreational or tobacco use. FH: Father - HD, cancer.  On 3/30/22, he said he continued on Humira Q 2 weeks - remembered to take his dose (typically on Sundays). He had incorporated more fish and veggies into his diet. He reported looser stools lately. He had a BM 3-4x/week occasionally with no formation (soft). He did not see blood.  On 10/4/22, he said he was compliant with taking Humira every other week - no bleeding, abdominal pain, or diarrhea. He finished Cephalexin 2 weeks ago for a kidney infection.    On 8/23/23, he said he had a recent kidney infection. He continued on Humira Q2W - no diarrhea, pain, or bleeding. He was on vit D supplementation. He was currently not working due to the [employer] strike.  On 10/4/23, he said he continued on Humira Q2W. His last injection was on Sept 24 - no diarrhea, abdominal pain, or bleeding.  On 12/5/23, he said he had not started on the weekly Humira dosing yet. No rectal bleeding or abdominal pain. Occaisonal diarrhea. He would have insurance until March.  HPI: Today, he says his insurance did not approve weekly Humira. He has two pens left and his insurance will lapse at the end of this month.   Labs 10/6/23 - Adalimumab drug level 6.1/Ab < 25.  8/23/23 - Quant-TB negative. CBC, CMP, TPMT all normal. 10/4/22 - CBC, CMP, vit D all normal. 4/1/22 - Adalimumab drug level 3.6/Ab <25. Quant-TB negative. CBC, CMP all normal. 8/26/21 - HbsAg <3.1. Quant-TB negative. CBC, CMP, TSH/FT4 all normal.

## 2024-03-06 ENCOUNTER — OV EP (OUTPATIENT)
Dept: URBAN - METROPOLITAN AREA CLINIC 105 | Facility: CLINIC | Age: 54
End: 2024-03-06

## 2024-03-26 ENCOUNTER — OV EP (OUTPATIENT)
Dept: URBAN - METROPOLITAN AREA CLINIC 105 | Facility: CLINIC | Age: 54
End: 2024-03-26
Payer: COMMERCIAL

## 2024-03-26 VITALS
SYSTOLIC BLOOD PRESSURE: 169 MMHG | HEIGHT: 74 IN | BODY MASS INDEX: 25.41 KG/M2 | TEMPERATURE: 97.3 F | WEIGHT: 198 LBS | DIASTOLIC BLOOD PRESSURE: 88 MMHG | HEART RATE: 78 BPM

## 2024-03-26 DIAGNOSIS — E55.9 VITAMIN D DEFICIENCY: ICD-10-CM

## 2024-03-26 DIAGNOSIS — K50.10 CROHN'S DISEASE OF LARGE INTESTINE WITHOUT COMPLICATION: ICD-10-CM

## 2024-03-26 PROCEDURE — 99213 OFFICE O/P EST LOW 20 MIN: CPT | Performed by: INTERNAL MEDICINE

## 2024-03-26 RX ORDER — ATORVASTATIN CALCIUM 20 MG/1
1 TABLET TABLET, FILM COATED ORAL ONCE A DAY
Status: ACTIVE | COMMUNITY

## 2024-03-26 RX ORDER — MERCAPTOPURINE 50 MG/1
2 TABLETS TABLET ORAL DAILY
Qty: 60 TABLET | Refills: 2 | Status: ACTIVE | COMMUNITY
Start: 2024-03-01

## 2024-03-26 RX ORDER — LOSARTAN POTASSIUM AND HYDROCHLOROTHIAZIDE 12.5; 5 MG/1; MG/1
TABLET ORAL
Qty: 90 | Status: ACTIVE | COMMUNITY

## 2024-03-26 RX ORDER — GLUCOSAMINE/CHONDR SU A SOD 750-600 MG
1 CAPSULE TABLET ORAL ONCE A DAY
Status: ACTIVE | COMMUNITY

## 2024-03-26 RX ORDER — ADALIMUMAB-ADAZ 40 MG/.4ML
40 MG INJECTION, SOLUTION SUBCUTANEOUS WEEKLY
Qty: 4 | Refills: 11 | Status: ACTIVE | COMMUNITY

## 2024-03-26 RX ORDER — OMEGA-3 FATTY ACIDS/FISH OIL 300-1000MG
1 CAPSULE CAPSULE ORAL ONCE A DAY
Status: ACTIVE | COMMUNITY

## 2024-03-26 NOTE — HPI-TODAY'S VISIT:
The patient presents on follow-up for Crohn's disease.  PAST MEDICAL HISTORY: On 8/26/21, the patient said he moved to [place] from [place] 1 year ago. He previously followed-up with Dr. Shmuel Lizarraga at [facility]. He said he was initially diagnosed with UC then Crohn's 15 years ago - definite inflammation in colon. He had been on Humira every 2 weeks starting in 2012, but was off the injections for 3 weeks during which he had a colon (Sept 2020, his last one) which noted inflammation. He was off Humira from Sept 2020-April 2021 because of a lapse in insurance - coverage resumed in March. His PCP is Conchita Palacios MD ([facility]). He had previously taken Lialda and prior to that sulfasalazine. He had never taken 6-MP. He denied diarrhea, rectal bleeding, or abdominal pain. Last labs were more than a year ago. He was in the film business.  PMH: Htn, hyperlipidemia, vit D deficiency. PSH: Finger surgery - right hand. SH: social drinker 2x/week, no recreational or tobacco use. FH: Father - HD, cancer.  On 3/30/22, he said he continued on Humira Q 2 weeks - remembered to take his dose (typically on Sundays). He had incorporated more fish and veggies into his diet. He reported looser stools lately. He had a BM 3-4x/week occasionally with no formation (soft). He did not see blood.  On 10/4/22, he said he was compliant with taking Humira every other week - no bleeding, abdominal pain, or diarrhea. He finished Cephalexin 2 weeks ago for a kidney infection.    On 8/23/23, he said he had a recent kidney infection. He continued on Humira Q2W - no diarrhea, pain, or bleeding. He was on vit D supplementation. He was currently not working due to the [employer] strike.  On 10/4/23, he said he continued on Humira Q2W. His last injection was on Sept 24 - no diarrhea, abdominal pain, or bleeding.  On 12/5/23, he said he had not started on the weekly Humira dosing yet. No rectal bleeding or abdominal pain. Occaisonal diarrhea. He would have insurance until March.  On 3/1/24, he said his insurance did not approve weekly Humira. He had two pens left and his insurance will lapse at the end of this month.   HPI: Today, he says he is still on Humira Q2W, but insurance wants him to switch to the generic. He last took Humira on a weekly basis 1 month ago. He has started on 6-MP 50 mg 4 days ago, taking 2 pills QD - no diarrhea, abdominal pain, or rectal bleeding. He is on the same insurance - will actually end at the end of April and will be on a month-to-month basis thereafter.  Labs 10/6/23 - Adalimumab drug level 6.1/Ab < 25.  8/23/23 - Quant-TB negative. CBC, CMP, TPMT all normal. 10/4/22 - CBC, CMP, vit D all normal. 4/1/22 - Adalimumab drug level 3.6/Ab <25. Quant-TB negative. CBC, CMP all normal. 8/26/21 - HbsAg <3.1. Quant-TB negative. CBC, CMP, TSH/FT4 all normal.

## 2024-05-14 ENCOUNTER — DASHBOARD ENCOUNTERS (OUTPATIENT)
Age: 54
End: 2024-05-14

## 2024-05-14 ENCOUNTER — OFFICE VISIT (OUTPATIENT)
Dept: URBAN - METROPOLITAN AREA CLINIC 105 | Facility: CLINIC | Age: 54
End: 2024-05-14
Payer: COMMERCIAL

## 2024-05-14 VITALS
DIASTOLIC BLOOD PRESSURE: 78 MMHG | BODY MASS INDEX: 24.64 KG/M2 | TEMPERATURE: 97.7 F | WEIGHT: 192 LBS | SYSTOLIC BLOOD PRESSURE: 117 MMHG | HEART RATE: 76 BPM | HEIGHT: 74 IN

## 2024-05-14 DIAGNOSIS — E55.9 VITAMIN D DEFICIENCY: ICD-10-CM

## 2024-05-14 DIAGNOSIS — K50.10 CROHN'S DISEASE OF LARGE INTESTINE WITHOUT COMPLICATION: ICD-10-CM

## 2024-05-14 PROCEDURE — 99214 OFFICE O/P EST MOD 30 MIN: CPT | Performed by: INTERNAL MEDICINE

## 2024-05-14 RX ORDER — MERCAPTOPURINE 50 MG/1
2 TABLETS TABLET ORAL DAILY
Qty: 60 TABLET | Refills: 2 | Status: ACTIVE | COMMUNITY
Start: 2024-03-01

## 2024-05-14 RX ORDER — ADALIMUMAB-ADAZ 40 MG/.4ML
40 MG INJECTION, SOLUTION SUBCUTANEOUS WEEKLY
Qty: 4 | Refills: 11 | Status: ACTIVE | COMMUNITY

## 2024-05-14 RX ORDER — GLUCOSAMINE/CHONDR SU A SOD 750-600 MG
1 CAPSULE TABLET ORAL ONCE A DAY
Status: ACTIVE | COMMUNITY

## 2024-05-14 RX ORDER — OMEGA-3 FATTY ACIDS/FISH OIL 300-1000MG
1 CAPSULE CAPSULE ORAL ONCE A DAY
Status: ACTIVE | COMMUNITY

## 2024-05-14 RX ORDER — ATORVASTATIN CALCIUM 20 MG/1
1 TABLET TABLET, FILM COATED ORAL ONCE A DAY
Status: ACTIVE | COMMUNITY

## 2024-05-14 RX ORDER — LOSARTAN POTASSIUM AND HYDROCHLOROTHIAZIDE 12.5; 5 MG/1; MG/1
TABLET ORAL
Qty: 90 | Status: ACTIVE | COMMUNITY

## 2024-05-29 LAB
6 MMP: 2679
6 TG: 202
A/G RATIO: 1.3
ABSOLUTE BASOPHILS: 29
ABSOLUTE EOSINOPHILS: 41
ABSOLUTE LYMPHOCYTES: 1641
ABSOLUTE MONOCYTES: 313
ABSOLUTE NEUTROPHILS: 876
ALBUMIN: 4.2
ALKALINE PHOSPHATASE: 64
ALT (SGPT): 19
AST (SGOT): 19
BASOPHILS: 1
BILIRUBIN, TOTAL: 0.8
BUN/CREATININE RATIO: (no result)
BUN: 17
CALCIUM: 9.6
CARBON DIOXIDE, TOTAL: 25
CHLORIDE: 103
CREATININE: 1.04
EGFR: 85
EOSINOPHILS: 1.4
GLOBULIN, TOTAL: 3.2
GLUCOSE: 84
HEMATOCRIT: 38.2
HEMOGLOBIN: 12.8
LYMPHOCYTES: 56.6
MCH: 28.6
MCHC: 33.5
MCV: 85.5
MITOGEN-NIL: >10
MONOCYTES: 10.8
MPV: 11
NEUTROPHILS: 30.2
PLATELET COUNT: 184
POTASSIUM: 4.1
PROTEIN, TOTAL: 7.4
QUANTIFERON NIL VALUE: 0.06
QUANTIFERON TB1 AG VALUE: 0.01
QUANTIFERON TB2 AG VALUE: 0
QUANTIFERON-TB GOLD PLUS: NEGATIVE
RDW: 19.7
RED BLOOD CELL COUNT: 4.47
SODIUM: 139
WHITE BLOOD CELL COUNT: 2.9

## 2024-08-13 ENCOUNTER — ERX REFILL RESPONSE (OUTPATIENT)
Dept: URBAN - METROPOLITAN AREA CLINIC 105 | Facility: CLINIC | Age: 54
End: 2024-08-13

## 2024-08-13 RX ORDER — MERCAPTOPURINE 50 MG/1
2 TABLETS TABLET ORAL DAILY
Qty: 60 TABLET | Refills: 2 | OUTPATIENT

## 2024-08-13 RX ORDER — MERCAPTOPURINE 50 MG/1
TAKE 2 TABLETS DAILY TABLET ORAL
Qty: 60 TABLET | Refills: 5 | OUTPATIENT

## 2024-11-13 ENCOUNTER — OFFICE VISIT (OUTPATIENT)
Dept: URBAN - METROPOLITAN AREA CLINIC 105 | Facility: CLINIC | Age: 54
End: 2024-11-13
Payer: COMMERCIAL

## 2024-11-13 VITALS
HEIGHT: 74 IN | WEIGHT: 197 LBS | BODY MASS INDEX: 25.28 KG/M2 | SYSTOLIC BLOOD PRESSURE: 128 MMHG | TEMPERATURE: 97.4 F | HEART RATE: 65 BPM | DIASTOLIC BLOOD PRESSURE: 84 MMHG

## 2024-11-13 DIAGNOSIS — E55.9 VITAMIN D DEFICIENCY: ICD-10-CM

## 2024-11-13 DIAGNOSIS — K50.10 CROHN'S DISEASE OF LARGE INTESTINE WITHOUT COMPLICATION: ICD-10-CM

## 2024-11-13 DIAGNOSIS — D64.9 ANEMIA, UNSPECIFIED TYPE: ICD-10-CM

## 2024-11-13 PROBLEM — 271737000: Status: ACTIVE | Noted: 2024-11-13

## 2024-11-13 PROCEDURE — 99214 OFFICE O/P EST MOD 30 MIN: CPT | Performed by: INTERNAL MEDICINE

## 2024-11-13 RX ORDER — ATORVASTATIN CALCIUM 20 MG/1
1 TABLET TABLET, FILM COATED ORAL ONCE A DAY
Status: ACTIVE | COMMUNITY

## 2024-11-13 RX ORDER — ADALIMUMAB-ADAZ 40 MG/.4ML
40 MG INJECTION, SOLUTION SUBCUTANEOUS WEEKLY
Qty: 4 | Refills: 11 | Status: ACTIVE | COMMUNITY

## 2024-11-13 RX ORDER — LOSARTAN POTASSIUM AND HYDROCHLOROTHIAZIDE 12.5; 5 MG/1; MG/1
TABLET ORAL
Qty: 90 | Status: ACTIVE | COMMUNITY

## 2024-11-13 RX ORDER — GLUCOSAMINE/CHONDR SU A SOD 750-600 MG
1 CAPSULE TABLET ORAL ONCE A DAY
Status: ACTIVE | COMMUNITY

## 2024-11-13 RX ORDER — MERCAPTOPURINE 50 MG/1
TAKE 2 TABLETS DAILY TABLET ORAL
Qty: 60 TABLET | Refills: 5 | Status: ACTIVE | COMMUNITY

## 2024-11-13 RX ORDER — OMEGA-3 FATTY ACIDS/FISH OIL 300-1000MG
1 CAPSULE CAPSULE ORAL ONCE A DAY
Status: ACTIVE | COMMUNITY

## 2024-11-13 NOTE — HPI-TODAY'S VISIT:
The patient presents on follow-up for Crohn's disease.  PAST MEDICAL HISTORY: On 8/26/21, the patient said he moved to [place] from [place] 1 year ago. He previously followed-up with Dr. Shmuel Lizarraga at [facility]. He said he was initially diagnosed with UC then Crohn's 15 years ago - definite inflammation in colon. He had been on Humira every 2 weeks starting in 2012, but was off the injections for 3 weeks during which he had a colon (Sept 2020, his last one) which noted inflammation. He was off Humira from Sept 2020-April 2021 because of a lapse in insurance - coverage resumed in March. His PCP is Conchita Palacios MD ([facility]). He had previously taken Lialda and prior to that sulfasalazine. He had never taken 6-MP. He denied diarrhea, rectal bleeding, or abdominal pain. Last labs were more than a year ago. He was in the film business.  PMH: Htn, hyperlipidemia, vit D deficiency. PSH: Finger surgery - right hand. SH: social drinker 2x/week, no recreational or tobacco use. FH: Father - HD, cancer.  On 3/30/22, he said he continued on Humira Q 2 weeks - remembered to take his dose (typically on Sundays). He had incorporated more fish and veggies into his diet. He reported looser stools lately. He had a BM 3-4x/week occasionally with no formation (soft). He did not see blood.  On 10/4/22, he said he was compliant with taking Humira every other week - no bleeding, abdominal pain, or diarrhea. He finished Cephalexin 2 weeks ago for a kidney infection.    On 8/23/23, he said he had a recent kidney infection. He continued on Humira Q2W - no diarrhea, pain, or bleeding. He was on vit D supplementation. He was currently not working due to the [employer] strike.  On 10/4/23, he said he continued on Humira Q2W. His last injection was on Sept 24 - no diarrhea, abdominal pain, or bleeding.  On 12/5/23, he said he had not started on the weekly Humira dosing yet. No rectal bleeding or abdominal pain. Occaisonal diarrhea. He would have insurance until March.  On 3/1/24, he said his insurance did not approve weekly Humira. He had two pens left and his insurance will lapse at the end of this month.   On 3/26/24, he said he was still on Humira Q2W, but insurance wanted him to switch to the generic. He last took Humira on a weekly basis 1 month ago. He had started on 6-MP 50 mg 4 days ago, taking 2 pills QD - no diarrhea, abdominal pain, or rectal bleeding. He was on the same insurance - would actually end at the end of April and would be on a month-to-month basis thereafter.  On 5/14/24, he said he was now on generic Humira (Hyrimoz), now taking it weekly, and continued on 6-MP - no blood, no diarrhea, no abdominal pain. He was on vitamin D supplementation. He was now on amlodipine 20 mg QD.  HPI Today, he says he continues on Hyrimoz weekly and 6-MP 50 mg 2 pills QD - no diarrhea, abdominal pain, or rectal bleeding. He continues on vit D supplementation.  Labs 5/23/24 - 6 , 6 MMP 2679. CBC normal except WBC 2.9, hgb 12.8. Quant-TB negative. CMP normal. 10/6/23 - Adalimumab drug level 6.1/Ab < 25.  8/23/23 - Quant-TB negative. CBC, CMP, TPMT all normal. 10/4/22 - CBC, CMP, vit D all normal. 4/1/22 - Adalimumab drug level 3.6/Ab <25. Quant-TB negative. CBC, CMP all normal. 8/26/21 - HbsAg <3.1. Quant-TB negative. CBC, CMP, TSH/FT4 all normal.

## 2024-11-14 LAB
FERRITIN, SERUM: 9
FOLATE, SERUM: 13.9
HEMATOCRIT: 43.6
HEMOGLOBIN A2 (QUANT): 2.6
HEMOGLOBIN: 14.1
HGB A: 97.4
HGB F: <1
IRON BIND.CAP.(TIBC): 361
IRON SATURATION: 18
IRON: 66
MCH: 28.6
MCV: 88.4
RDW: 14.5
RED BLOOD CELL COUNT: 4.93
VITAMIN B12: 346

## 2024-11-15 ENCOUNTER — TELEPHONE ENCOUNTER (OUTPATIENT)
Dept: URBAN - METROPOLITAN AREA CLINIC 105 | Facility: CLINIC | Age: 54
End: 2024-11-15

## 2024-11-15 PROBLEM — 724556004: Status: ACTIVE | Noted: 2024-11-15

## 2024-11-15 RX ORDER — FERROUS GLUCONATE 324 MG
1 TABLET TABLET ORAL DAILY
Qty: 90 TABLET | Refills: 0 | OUTPATIENT
Start: 2024-11-15

## 2024-12-30 ENCOUNTER — ERX REFILL RESPONSE (OUTPATIENT)
Dept: URBAN - METROPOLITAN AREA CLINIC 105 | Facility: CLINIC | Age: 54
End: 2024-12-30

## 2024-12-30 RX ORDER — MERCAPTOPURINE 50 MG/1
TAKE 2 TABLETS DAILY TABLET ORAL
Qty: 60 TABLET | Refills: 5 | OUTPATIENT

## 2025-02-24 ENCOUNTER — TELEPHONE ENCOUNTER (OUTPATIENT)
Dept: URBAN - METROPOLITAN AREA CLINIC 105 | Facility: CLINIC | Age: 55
End: 2025-02-24

## 2025-03-17 ENCOUNTER — ERX REFILL RESPONSE (OUTPATIENT)
Dept: URBAN - METROPOLITAN AREA CLINIC 105 | Facility: CLINIC | Age: 55
End: 2025-03-17

## 2025-03-17 RX ORDER — ADALIMUMAB-ADAZ 40 MG/.4ML
40 MG INJECTION, SOLUTION SUBCUTANEOUS WEEKLY
Qty: 4 | Refills: 11 | OUTPATIENT

## 2025-03-17 RX ORDER — ADALIMUMAB-ADAZ 40 MG/.4ML
INJECT 1 PEN UNDER THE SKIN EVERY 7 DAYS INJECTION, SOLUTION SUBCUTANEOUS
Qty: 4 | Refills: 11 | OUTPATIENT

## 2025-05-14 ENCOUNTER — OFFICE VISIT (OUTPATIENT)
Dept: URBAN - METROPOLITAN AREA CLINIC 105 | Facility: CLINIC | Age: 55
End: 2025-05-14
Payer: COMMERCIAL

## 2025-05-14 DIAGNOSIS — R63.0 ANOREXIA: ICD-10-CM

## 2025-05-14 DIAGNOSIS — D64.9 ANEMIA, UNSPECIFIED TYPE: ICD-10-CM

## 2025-05-14 DIAGNOSIS — E55.9 VITAMIN D DEFICIENCY: ICD-10-CM

## 2025-05-14 DIAGNOSIS — K50.10 CROHN'S DISEASE OF LARGE INTESTINE WITHOUT COMPLICATION: ICD-10-CM

## 2025-05-14 DIAGNOSIS — D72.819 LEUKOPENIA, UNSPECIFIED TYPE: ICD-10-CM

## 2025-05-14 DIAGNOSIS — R63.4 UNINTENTIONAL WEIGHT LOSS: ICD-10-CM

## 2025-05-14 DIAGNOSIS — K59.00 CONSTIPATION, UNSPECIFIED CONSTIPATION TYPE: ICD-10-CM

## 2025-05-14 PROBLEM — 84828003: Status: ACTIVE | Noted: 2025-05-14

## 2025-05-14 PROBLEM — 79890006: Status: ACTIVE | Noted: 2025-05-14

## 2025-05-14 PROCEDURE — 99214 OFFICE O/P EST MOD 30 MIN: CPT | Performed by: INTERNAL MEDICINE

## 2025-05-14 RX ORDER — GLUCOSAMINE/CHONDR SU A SOD 750-600 MG
1 CAPSULE TABLET ORAL ONCE A DAY
Status: ACTIVE | COMMUNITY

## 2025-05-14 RX ORDER — MERCAPTOPURINE 50 MG/1
TAKE 2 TABLETS DAILY TABLET ORAL
Qty: 60 TABLET | Refills: 5 | Status: ACTIVE | COMMUNITY

## 2025-05-14 RX ORDER — OMEGA-3 FATTY ACIDS/FISH OIL 300-1000MG
1 CAPSULE CAPSULE ORAL ONCE A DAY
Status: ACTIVE | COMMUNITY

## 2025-05-14 RX ORDER — ATORVASTATIN CALCIUM 20 MG/1
1 TABLET TABLET, FILM COATED ORAL ONCE A DAY
Status: ACTIVE | COMMUNITY

## 2025-05-14 RX ORDER — LOSARTAN POTASSIUM AND HYDROCHLOROTHIAZIDE 12.5; 5 MG/1; MG/1
TABLET ORAL
Qty: 90 | Status: ACTIVE | COMMUNITY

## 2025-05-14 RX ORDER — MIRTAZAPINE 15 MG/1
1 TABLET AT BEDTIME TABLET, FILM COATED ORAL ONCE A DAY
Qty: 30 | Refills: 2 | OUTPATIENT
Start: 2025-05-14

## 2025-05-14 RX ORDER — FERROUS GLUCONATE 324 MG
1 TABLET TABLET ORAL DAILY
Qty: 90 TABLET | Refills: 0 | Status: ACTIVE | COMMUNITY
Start: 2024-11-15

## 2025-05-14 RX ORDER — ADALIMUMAB-ADAZ 40 MG/.4ML
INJECT 1 PEN UNDER THE SKIN EVERY 7 DAYS INJECTION, SOLUTION SUBCUTANEOUS
Qty: 4 | Refills: 11 | Status: ACTIVE | COMMUNITY

## 2025-05-14 NOTE — PHYSICAL EXAM HENT:
Head, normocephalic, atraumatic
Detail Level: Detailed
Quality 110: Preventive Care And Screening: Influenza Immunization: Influenza Immunization Administered during Influenza season

## 2025-05-14 NOTE — HPI-TODAY'S VISIT:
The patient presents on follow-up for Crohn's disease.  PAST MEDICAL HISTORY: On 8/26/21, the patient said he moved to [place] from [place] 1 year ago. He previously followed-up with Dr. Shmuel Lizarraga at [facility]. He said he was initially diagnosed with UC then Crohn's 15 years ago - definite inflammation in colon. He had been on Humira every 2 weeks starting in 2012, but was off the injections for 3 weeks during which he had a colon (Sept 2020, his last one) which noted inflammation. He was off Humira from Sept 2020-April 2021 because of a lapse in insurance - coverage resumed in March. His PCP is Conchita Palaicos MD ([facility]). He had previously taken Lialda and prior to that sulfasalazine. He had never taken 6-MP. He denied diarrhea, rectal bleeding, or abdominal pain. Last labs were more than a year ago. He was in the film business.  PMH: Htn, hyperlipidemia, vit D deficiency. PSH: Finger surgery - right hand. SH: social drinker 2x/week, no recreational or tobacco use. FH: Father - HD, cancer.  On 3/30/22, he said he continued on Humira Q 2 weeks - remembered to take his dose (typically on Sundays). He had incorporated more fish and veggies into his diet. He reported looser stools lately. He had a BM 3-4x/week occasionally with no formation (soft). He did not see blood.  On 10/4/22, he said he was compliant with taking Humira every other week - no bleeding, abdominal pain, or diarrhea. He finished Cephalexin 2 weeks ago for a kidney infection.    On 8/23/23, he said he had a recent kidney infection. He continued on Humira Q2W - no diarrhea, pain, or bleeding. He was on vit D supplementation. He was currently not working due to the [employer] strike.  On 10/4/23, he said he continued on Humira Q2W. His last injection was on Sept 24 - no diarrhea, abdominal pain, or bleeding.  On 12/5/23, he said he had not started on the weekly Humira dosing yet. No rectal bleeding or abdominal pain. Occaisonal diarrhea. He would have insurance until March.  On 3/1/24, he said his insurance did not approve weekly Humira. He had two pens left and his insurance will lapse at the end of this month.   On 3/26/24, he said he was still on Humira Q2W, but insurance wanted him to switch to the generic. He last took Humira on a weekly basis 1 month ago. He had started on 6-MP 50 mg 4 days ago, taking 2 pills QD - no diarrhea, abdominal pain, or rectal bleeding. He was on the same insurance - would actually end at the end of April and would be on a month-to-month basis thereafter.  On 5/14/24, he said he was now on generic Humira (Hyrimoz), now taking it weekly, and continued on 6-MP - no blood, no diarrhea, no abdominal pain. He was on vitamin D supplementation. He was now on amlodipine 20 mg QD.  On 11/13/24, he said he continued on Hyrimoz weekly and 6-MP 50 mg 2 pills QD - no diarrhea, abdominal pain, or rectal bleeding. He continued on vit D supplementation.  HPI Today, he says he's had intermittent constipation. Magnesium helped, but not complete relief. A laxative helped, but also not complete relief. He can go up to 4-5 days w/o a BM. He has also unintentionally lost 25 lbs which could be attributed to depression he states. He notes having a significantly decreased appetite and food "does not taste like anything." He is working out. Ideal weight is 194 lbs - today is 183 lbs. Recent WBC with PCP was low (2.3). He is having difficulty getting Hyrimoz refills - has 6 pens left. No diarrhea, rectal bleeding, or abdominal pain.  Labs 4/25/25 - CBC normal except WBC 2.3. CMP normal except potassium 3.4. TSH normal. 11/13/24 - Ferritin 9, iron sat 18%, iron 66, TIBC 361. Hemoglobinopathy, B12, and folate all normal. 5/23/24 - 6 , 6 MMP 2679. CBC normal except WBC 2.9, hgb 12.8. Quant-TB negative. CMP normal. 10/6/23 - Adalimumab drug level 6.1/Ab < 25.  8/23/23 - Quant-TB negative. CBC, CMP, TPMT all normal. 10/4/22 - CBC, CMP, vit D all normal. 4/1/22 - Adalimumab drug level 3.6/Ab <25. Quant-TB negative. CBC, CMP all normal. 8/26/21 - HbsAg <3.1. Quant-TB negative. CBC, CMP, TSH/FT4 all normal.

## 2025-05-17 PROBLEM — 14760008: Status: ACTIVE | Noted: 2025-05-14

## 2025-05-19 LAB
6 MMP: 1578
6 TG: 136
ABSOLUTE BASOPHILS: 41
ABSOLUTE EOSINOPHILS: 29
ABSOLUTE LYMPHOCYTES: 1224
ABSOLUTE MONOCYTES: 583
ABSOLUTE NEUTROPHILS: 1024
BASOPHILS: 1.4
EOSINOPHILS: 1
HEMATOCRIT: 40.7
HEMOGLOBIN: 14
LYMPHOCYTES: 42.2
MCH: 34
MCHC: 34.4
MCV: 98.8
MONOCYTES: 20.1
MPV: 10.3
NEUTROPHILS: 35.3
PLATELET COUNT: 245
RDW: 17.1
RED BLOOD CELL COUNT: 4.12
WHITE BLOOD CELL COUNT: 2.9

## 2025-06-09 ENCOUNTER — ERX REFILL RESPONSE (OUTPATIENT)
Dept: URBAN - METROPOLITAN AREA CLINIC 105 | Facility: CLINIC | Age: 55
End: 2025-06-09

## 2025-06-09 RX ORDER — MIRTAZAPINE 15 MG/1
TAKE 1 TABLET BY MOUTH EVERYDAY AT BEDTIME TABLET ORAL
Qty: 90 TABLET | Refills: 1 | OUTPATIENT

## 2025-06-27 ENCOUNTER — OFFICE VISIT (OUTPATIENT)
Dept: URBAN - METROPOLITAN AREA CLINIC 105 | Facility: CLINIC | Age: 55
End: 2025-06-27
Payer: COMMERCIAL

## 2025-06-27 ENCOUNTER — LAB OUTSIDE AN ENCOUNTER (OUTPATIENT)
Dept: URBAN - METROPOLITAN AREA CLINIC 105 | Facility: CLINIC | Age: 55
End: 2025-06-27

## 2025-06-27 DIAGNOSIS — R63.0 ANOREXIA: ICD-10-CM

## 2025-06-27 DIAGNOSIS — E55.9 VITAMIN D DEFICIENCY: ICD-10-CM

## 2025-06-27 DIAGNOSIS — D72.819 LEUKOPENIA, UNSPECIFIED TYPE: ICD-10-CM

## 2025-06-27 DIAGNOSIS — K50.10 CROHN'S DISEASE OF LARGE INTESTINE WITHOUT COMPLICATION: ICD-10-CM

## 2025-06-27 DIAGNOSIS — R63.4 UNINTENTIONAL WEIGHT LOSS: ICD-10-CM

## 2025-06-27 DIAGNOSIS — K59.00 CONSTIPATION, UNSPECIFIED CONSTIPATION TYPE: ICD-10-CM

## 2025-06-27 DIAGNOSIS — D64.9 ANEMIA, UNSPECIFIED TYPE: ICD-10-CM

## 2025-06-27 PROCEDURE — 99214 OFFICE O/P EST MOD 30 MIN: CPT | Performed by: INTERNAL MEDICINE

## 2025-06-27 RX ORDER — LOSARTAN POTASSIUM AND HYDROCHLOROTHIAZIDE 12.5; 5 MG/1; MG/1
TABLET ORAL
Qty: 90 | Status: ACTIVE | COMMUNITY

## 2025-06-27 RX ORDER — OMEGA-3 FATTY ACIDS/FISH OIL 300-1000MG
1 CAPSULE CAPSULE ORAL ONCE A DAY
Status: ACTIVE | COMMUNITY

## 2025-06-27 RX ORDER — ATORVASTATIN CALCIUM 20 MG/1
1 TABLET TABLET, FILM COATED ORAL ONCE A DAY
Status: ACTIVE | COMMUNITY

## 2025-06-27 RX ORDER — ADALIMUMAB-ADAZ 40 MG/.4ML
INJECT 1 PEN UNDER THE SKIN EVERY 7 DAYS INJECTION, SOLUTION SUBCUTANEOUS
Qty: 4 | Refills: 11 | Status: ACTIVE | COMMUNITY

## 2025-06-27 RX ORDER — MIRTAZAPINE 15 MG/1
1 TABLET AT BEDTIME TABLET, FILM COATED ORAL ONCE A DAY
Qty: 30 | Refills: 2 | Status: ON HOLD | COMMUNITY
Start: 2025-05-14

## 2025-06-27 RX ORDER — FERROUS GLUCONATE 324 MG
1 TABLET TABLET ORAL DAILY
Qty: 90 TABLET | Refills: 0 | Status: ON HOLD | COMMUNITY
Start: 2024-11-15

## 2025-06-27 RX ORDER — GLUCOSAMINE/CHONDR SU A SOD 750-600 MG
1 CAPSULE TABLET ORAL ONCE A DAY
Status: ACTIVE | COMMUNITY

## 2025-06-27 RX ORDER — MERCAPTOPURINE 50 MG/1
AS DIRECTED TABLET ORAL
Status: ACTIVE | COMMUNITY

## 2025-06-27 RX ORDER — MIRTAZAPINE 15 MG/1
TAKE 1 TABLET BY MOUTH EVERYDAY AT BEDTIME TABLET ORAL
Qty: 90 TABLET | Refills: 1 | Status: ON HOLD | COMMUNITY

## 2025-06-27 NOTE — HPI-TODAY'S VISIT:
The patient presents on follow-up for Crohn's disease.  PAST MEDICAL HISTORY: On 8/26/21, the patient said he moved to [place] from [place] 1 year ago. He previously followed-up with Dr. Shmuel Lizarraga at [facility]. He said he was initially diagnosed with UC then Crohn's 15 years ago - definite inflammation in colon. He had been on Humira every 2 weeks starting in 2012, but was off the injections for 3 weeks during which he had a colon (Sept 2020, his last one) which noted inflammation. He was off Humira from Sept 2020-April 2021 because of a lapse in insurance - coverage resumed in March. His PCP is Conchita Palacios MD ([facility]). He had previously taken Lialda and prior to that sulfasalazine. He had never taken 6-MP. He denied diarrhea, rectal bleeding, or abdominal pain. Last labs were more than a year ago. He was in the film business.  PMH: Htn, hyperlipidemia, vit D deficiency. PSH: Finger surgery - right hand. SH: social drinker 2x/week, no recreational or tobacco use. FH: Father - HD, cancer.  On 3/30/22, he said he continued on Humira Q 2 weeks - remembered to take his dose (typically on Sundays). He had incorporated more fish and veggies into his diet. He reported looser stools lately. He had a BM 3-4x/week occasionally with no formation (soft). He did not see blood.  On 10/4/22, he said he was compliant with taking Humira every other week - no bleeding, abdominal pain, or diarrhea. He finished Cephalexin 2 weeks ago for a kidney infection.    On 8/23/23, he said he had a recent kidney infection. He continued on Humira Q2W - no diarrhea, pain, or bleeding. He was on vit D supplementation. He was currently not working due to the [employer] strike.  On 10/4/23, he said he continued on Humira Q2W. His last injection was on Sept 24 - no diarrhea, abdominal pain, or bleeding.  On 12/5/23, he said he had not started on the weekly Humira dosing yet. No rectal bleeding or abdominal pain. Occaisonal diarrhea. He would have insurance until March.  On 3/1/24, he said his insurance did not approve weekly Humira. He had two pens left and his insurance will lapse at the end of this month.   On 3/26/24, he said he was still on Humira Q2W, but insurance wanted him to switch to the generic. He last took Humira on a weekly basis 1 month ago. He had started on 6-MP 50 mg 4 days ago, taking 2 pills QD - no diarrhea, abdominal pain, or rectal bleeding. He was on the same insurance - would actually end at the end of April and would be on a month-to-month basis thereafter.  On 5/14/24, he said he was now on generic Humira (Hyrimoz), now taking it weekly, and continued on 6-MP - no blood, no diarrhea, no abdominal pain. He was on vitamin D supplementation. He was now on amlodipine 20 mg QD.  On 11/13/24, he said he continued on Hyrimoz weekly and 6-MP 50 mg 2 pills QD - no diarrhea, abdominal pain, or rectal bleeding. He continued on vit D supplementation.  On 5/14/25, he said he had intermittent constipation. Magnesium helped, but not complete relief. A laxative helped, but also not complete relief. He could go up to 4-5 days w/o a BM. He has also unintentionally lost 25 lbs which could be attributed to depression he stated. He noted having a significantly decreased appetite and food "does not taste like anything." He was working out. Ideal weight was 194 lbs - today was 183 lbs. Recent WBC with PCP was low (2.3). He was having difficulty getting Hyrimoz refills - had 6 pens left. No diarrhea, rectal bleeding, or abdominal pain.  HPI Today, he says he has 2 Hyrimoz pens at home. He has decreased 6MP 50 mg to 1 pill QD. No diarrhea, abd pain, or rectal bleeding. He took Remeron for a week and noted increased appetitie on it. Now that he's off, his appetite is still good. He has not needed Miralax for constipation. He is off iron.  Labs 5/14/25 - 6 , 6 MMP 1578. CBC normal except WBC 2.9. 4/25/25 - CBC normal except WBC 2.3. CMP normal except potassium 3.4. TSH normal. 11/13/24 - Ferritin 9, iron sat 18%, iron 66, TIBC 361. Hemoglobinopathy, B12, and folate all normal. 5/23/24 - 6 , 6 MMP 2679. CBC normal except WBC 2.9, hgb 12.8. Quant-TB negative. CMP normal. 10/6/23 - Adalimumab drug level 6.1/Ab < 25.  8/23/23 - Quant-TB negative. CBC, CMP, TPMT all normal. 10/4/22 - CBC, CMP, vit D all normal. 4/1/22 - Adalimumab drug level 3.6/Ab <25. Quant-TB negative. CBC, CMP all normal. 8/26/21 - HbsAg <3.1. Quant-TB negative. CBC, CMP, TSH/FT4 all normal.